# Patient Record
Sex: MALE | Race: WHITE | NOT HISPANIC OR LATINO | Employment: FULL TIME | URBAN - METROPOLITAN AREA
[De-identification: names, ages, dates, MRNs, and addresses within clinical notes are randomized per-mention and may not be internally consistent; named-entity substitution may affect disease eponyms.]

---

## 2024-04-29 ENCOUNTER — OFFICE VISIT (OUTPATIENT)
Dept: URGENT CARE | Facility: URGENT CARE | Age: 29
End: 2024-04-29
Payer: COMMERCIAL

## 2024-04-29 VITALS
RESPIRATION RATE: 12 BRPM | BODY MASS INDEX: 20.76 KG/M2 | HEIGHT: 70 IN | SYSTOLIC BLOOD PRESSURE: 117 MMHG | DIASTOLIC BLOOD PRESSURE: 67 MMHG | TEMPERATURE: 97.3 F | OXYGEN SATURATION: 100 % | HEART RATE: 54 BPM | WEIGHT: 145 LBS

## 2024-04-29 DIAGNOSIS — R10.84 ABDOMINAL PAIN, GENERALIZED: Primary | ICD-10-CM

## 2024-04-29 PROCEDURE — 99203 OFFICE O/P NEW LOW 30 MIN: CPT | Performed by: PHYSICIAN ASSISTANT

## 2024-04-29 ASSESSMENT — ENCOUNTER SYMPTOMS
ABDOMINAL PAIN: 1
CONSTIPATION: 0
APPETITE CHANGE: 0
NAUSEA: 0
FEVER: 0
VOMITING: 0
DYSURIA: 0

## 2024-04-29 NOTE — PROGRESS NOTES
"SUBJECTIVE:   Nikolas Johns is a 28 year old male presenting with a chief complaint of   Chief Complaint   Patient presents with    Urgent Care    Abdominal Pain     Patient presents with his lower abdomen below his navl being tender that started this am.       He is a new patient of [facility].  Patient presents with complaints of lower abdominal pain.  No dysuria, No BM problems.  No bulging,  last BM today, normal.   Aching type pain, worse with using abd muscles.  1-2/10 4/10 if he presses on it.  No trauma.      Treatment:  nothing    Review of Systems   Constitutional:  Negative for appetite change and fever.   Gastrointestinal:  Positive for abdominal pain. Negative for constipation, nausea and vomiting.   Genitourinary:  Negative for dysuria.   Skin:  Negative for rash.   All other systems reviewed and are negative.      History reviewed. No pertinent past medical history.  History reviewed. No pertinent family history.  No current outpatient medications on file.     Social History     Tobacco Use    Smoking status: Never    Smokeless tobacco: Never   Substance Use Topics    Alcohol use: Not on file       OBJECTIVE  /67 (BP Location: Right arm)   Pulse 54   Temp 97.3  F (36.3  C) (Temporal)   Resp 12   Ht 1.778 m (5' 10\")   Wt 65.8 kg (145 lb)   SpO2 100%   BMI 20.81 kg/m      Physical Exam  Vitals and nursing note reviewed.   Constitutional:       Appearance: Normal appearance. He is normal weight.   Eyes:      Extraocular Movements: Extraocular movements intact.      Conjunctiva/sclera: Conjunctivae normal.   Cardiovascular:      Rate and Rhythm: Normal rate and regular rhythm.      Pulses: Normal pulses.      Heart sounds: Normal heart sounds.   Pulmonary:      Effort: Pulmonary effort is normal.      Breath sounds: Normal breath sounds.   Abdominal:      General: Abdomen is flat. Bowel sounds are normal.      Palpations: Abdomen is soft.      Tenderness: There is abdominal tenderness. There is " no right CVA tenderness or left CVA tenderness.      Comments: No bulge, minimal tenderness to palpation of just below umbilicus.     Skin:     General: Skin is warm and dry.   Neurological:      Mental Status: He is alert.   Psychiatric:         Mood and Affect: Mood normal.         Labs:  No results found for this or any previous visit (from the past 24 hour(s)).      ASSESSMENT:      ICD-10-CM    1. Abdominal pain, generalized  R10.84            Medical Decision Making:    Differential Diagnosis:  Abdominal wall strain, UTI, hernia, nonspecific.    Serious Comorbid Conditions:  Adult:   reviewed    PLAN:    Relatively benign exam.  No red flags.  Discussed reasons to seek immediate medical attention.  Additionally if no improvement or worsening in one week, may follow up with PCP and/or UC.    Discussed red flag signs/symptoms.  Tylenol prn.      Followup:    If not improving or if condition worsens, follow up with your Primary Care Provider, If not improving or if conditions worsens over the next 12-24 hours, go to the Emergency Department    There are no Patient Instructions on file for this visit.

## 2024-07-28 ENCOUNTER — HEALTH MAINTENANCE LETTER (OUTPATIENT)
Age: 29
End: 2024-07-28

## 2025-01-29 ENCOUNTER — OFFICE VISIT (OUTPATIENT)
Dept: FAMILY MEDICINE | Facility: CLINIC | Age: 30
End: 2025-01-29
Payer: COMMERCIAL

## 2025-01-29 VITALS
BODY MASS INDEX: 21.67 KG/M2 | RESPIRATION RATE: 12 BRPM | OXYGEN SATURATION: 100 % | DIASTOLIC BLOOD PRESSURE: 68 MMHG | HEART RATE: 53 BPM | SYSTOLIC BLOOD PRESSURE: 133 MMHG | WEIGHT: 146.3 LBS | TEMPERATURE: 97.9 F | HEIGHT: 69 IN

## 2025-01-29 DIAGNOSIS — K12.0 APHTHOUS STOMATITIS: Primary | ICD-10-CM

## 2025-01-29 PROCEDURE — 99213 OFFICE O/P EST LOW 20 MIN: CPT | Performed by: PHYSICIAN ASSISTANT

## 2025-01-29 PROCEDURE — G2211 COMPLEX E/M VISIT ADD ON: HCPCS | Performed by: PHYSICIAN ASSISTANT

## 2025-01-29 ASSESSMENT — PAIN SCALES - GENERAL: PAINLEVEL_OUTOF10: MODERATE PAIN (4)

## 2025-01-29 NOTE — PROGRESS NOTES
Assessment & Plan     Aphthous stomatitis  On exam ulcers most suspicious for aphthous stomatitis. Discussed how these often resolve on their own within 10 days - 2 weeks and are managed symptomatically. Offered viscous lidocaine for pain control but patient feels his pain is well managed at this time. If symptoms do not resolve/worsen after 2 weeks will consider oral surgeon referral for further evaluation and possible biopsy.     The longitudinal plan of care for the diagnosis(es)/condition(s) as documented were addressed during this visit. Due to the added complexity in care, I will continue to support Nikolas in the subsequent management and with ongoing continuity of care.        Subjective   Nikolas is a 29 year old, presenting for the following health issues:  Lesion (In mouth/ back of throat./Sore on the left side of mouth.)      1/29/2025    10:24 AM   Additional Questions   Roomed by Brianna BARRETO     History of Present Illness       Reason for visit:  Mouth/throat wound(s), unsure of cause. Seems to have at least persisted, perhaps gotten worse over several days. Pain not severe, but can be tough to eat or drink normally.  Symptom onset:  3-7 days ago  Symptoms include:  Two sores in mouth (one on back of throat- worse; and one on side of mouth- more like a canker sore)  Symptom intensity:  Moderate  Symptom progression:  Worsening  Had these symptoms before:  No  What makes it worse:  Some eating, and first swigs of drink after not drinking for a while (i.e., wet after a period of dry)  What makes it better:  Drinking water consistently, and eating soft foods on the non-hurt side of my mouth   He is taking medications regularly.     Nikolas here today for evaluation of mouth sores. Noticed an ulcer on back of his throat last Friday, and another on inside of his left cheek since this Sunday. He is still piedad to eat and drink normally but has increased pain at these sites. No fevers, chills, night sweats. Was sick with  "a cold about 3 weeks ago but has since resolved. Has gotten canker sores on inside of his cheek before from biting it. No history of cold sores.        Objective    /68   Pulse 53   Temp 97.9  F (36.6  C) (Temporal)   Resp 12   Ht 1.763 m (5' 9.41\")   Wt 66.4 kg (146 lb 4.8 oz)   SpO2 100%   BMI 21.35 kg/m    Body mass index is 21.35 kg/m .  Physical Exam   GENERAL: alert and no distress  EYES: Eyes grossly normal to inspection, conjunctivae and sclerae normal  HENT: 1 discrete 7 mm ulcer on posterior pharynx with peripheral erythema and yellowish adherent exudate centrally. 1 similar discrete ulcer 2 mm in size behind L upper molar on buccal mucosa.   NECK: no adenopathy, no asymmetry, masses, or scars.   NEURO: Normal strength and tone, mentation intact and speech normal  PSYCH: mentation appears normal, affect normal/bright        RAYA Chairez    I personally supervised the student listed above. The above note is reflective of my independent physical exam and medical decision making.    Dali Pickard PA-C 1/29/2025, 12:39 PM  [facility]      Signed Electronically by: Dali Pickard PA-C    "

## 2025-08-10 ENCOUNTER — HEALTH MAINTENANCE LETTER (OUTPATIENT)
Age: 30
End: 2025-08-10